# Patient Record
Sex: FEMALE | Race: WHITE | Employment: OTHER | URBAN - METROPOLITAN AREA
[De-identification: names, ages, dates, MRNs, and addresses within clinical notes are randomized per-mention and may not be internally consistent; named-entity substitution may affect disease eponyms.]

---

## 2017-10-02 ENCOUNTER — HOSPITAL ENCOUNTER (EMERGENCY)
Age: 67
Discharge: HOME | End: 2017-10-02
Attending: EMERGENCY MEDICINE
Payer: MEDICARE

## 2017-10-02 ENCOUNTER — APPOINTMENT (OUTPATIENT)
Dept: GENERAL RADIOLOGY | Age: 67
End: 2017-10-02
Payer: MEDICARE

## 2017-10-02 VITALS
HEART RATE: 87 BPM | RESPIRATION RATE: 18 BRPM | TEMPERATURE: 98.6 F | SYSTOLIC BLOOD PRESSURE: 138 MMHG | HEIGHT: 61 IN | OXYGEN SATURATION: 97 % | BODY MASS INDEX: 26.81 KG/M2 | DIASTOLIC BLOOD PRESSURE: 69 MMHG | WEIGHT: 142 LBS

## 2017-10-02 DIAGNOSIS — S63.91XA HAND SPRAIN, RIGHT, INITIAL ENCOUNTER: Primary | ICD-10-CM

## 2017-10-02 PROCEDURE — 99283 EMERGENCY DEPT VISIT LOW MDM: CPT

## 2017-10-02 PROCEDURE — 73130 X-RAY EXAM OF HAND: CPT

## 2017-10-02 PROCEDURE — 73110 X-RAY EXAM OF WRIST: CPT

## 2017-10-02 RX ORDER — NAPROXEN 500 MG/1
500 TABLET ORAL 2 TIMES DAILY WITH MEALS
Qty: 20 TABLET | Refills: 0 | Status: SHIPPED | OUTPATIENT
Start: 2017-10-02

## 2017-10-02 ASSESSMENT — PAIN DESCRIPTION - DESCRIPTORS: DESCRIPTORS: THROBBING;ACHING;SHARP;STABBING

## 2017-10-02 ASSESSMENT — PAIN SCALES - GENERAL: PAINLEVEL_OUTOF10: 8

## 2017-10-02 ASSESSMENT — PAIN DESCRIPTION - LOCATION: LOCATION: WRIST

## 2017-10-02 ASSESSMENT — PAIN DESCRIPTION - ORIENTATION: ORIENTATION: RIGHT

## 2017-10-02 ASSESSMENT — PAIN DESCRIPTION - PAIN TYPE: TYPE: ACUTE PAIN

## 2017-10-02 NOTE — ED AVS SNAPSHOT
guidelines. However these guidelines can be individualized by your provider. 9/22/2001    Colonoscopy 9/22/2001    Zoster Vaccine 9/22/2011    Osteoporosis screening or a bone density scan (Dexa) is recommended once at age 72. Based upon the results and risk factors for bone loss, your provider will recommend whether this needs to be repeated. 9/22/2016    Pneumococcal Vaccines (two) for all adults aged 72 and over (1 of 2 - PCV13) 9/22/2016    Yearly Flu Vaccine (1) 9/1/2017                 Care Plan Once You Return Home    This section includes instructions you will need to follow once you leave the hospital.  Your care team will discuss these with you, so you and those caring for you know how to best care for your health needs at home. This section may also include educational information about certain health topics that may be of help to you. Important Information if you smoke or are exposed to smoking       SMOKING: QUIT SMOKING. THIS IS THE MOST IMPORTANT ACTION YOU CAN TAKE TO IMPROVE YOUR CURRENT AND FUTURE HEALTH. Call the [address] [place] [address] José Miguel at [place] NOW ([phone])    Smoking harms nonsmokers. When nonsmokers are around people who smoke, they absorb nicotine, carbon monoxide, and other ingredients of tobacco smoke. DO NOT SMOKE AROUND CHILDREN     Children exposed to secondhand smoke are at an increased risk of:  Sudden Infant Death Syndrome (SIDS), acute respiratory infections, inflammation of the middle ear, and severe asthma. Over a longer time, it causes heart disease and lung cancer. There is no safe level of exposure to secondhand smoke. [employer] Signup     [employer] allows you to send messages to your doctor, view your test results, renew your prescriptions, schedule appointments, view visit notes, and more. How Do I Sign Up? 1. In your Internet browser, go to [email or web address] 2. Click on the Sign Up Now link in the Sign In box. You will see the New Member Sign Up page. 3. Enter your [employer] M[employer] Access Code exactly as it appears below. You will not need to use this code after youve completed the sign-up process. If you do not sign up before the expiration date, you must request a new code. [employer] Access Code: [email or web address]  Expires: 12/1/2017 10:34 PM    4. Enter your Social Security Number ([id number]) and Date of Birth (mm/dd/yyyy) as indicated and click Submit. You will be taken to the next sign-up page. 5. Create a Funk[employer] Moves ID. This will be your [employer] login ID and cannot be changed, so think of one that is secure and easy to remember. 6. Create a [employer] password. You can change your password at any time. 7. Enter your Password Reset Question and Answer. This can be used at a later time if you forget your password. 8. Enter your e-mail address. You will receive e-mail notification when new information is available in [address] 9. Click Sign Up. You can now view your medical record. Additional Information  If you have questions, please contact the physician practice where you receive care. Remember, [employer] is NOT to be used for urgent needs. For medical emergencies, dial 911. For questions regarding your [employer] account call [phone]. If you have a clinical question, please call your doctor's office. View your information online  ? Review your current list of  medications, immunization, and allergies. ? Review your future test results online . ? Review your discharge instructions provided by your caregivers at discharge    Certain functionality such as prescription refills, scheduling appointments or sending messages to your provider are not activated if your provider does not use [employer] in his/her office    For questions regarding your [employer] account call [phone]. If you have a clinical question, please call your doctor's office. The information on all pages of the After Visit Summary has been reviewed with me, the patient and/or responsible adult, by my health care provider(s). I had the opportunity to ask questions regarding this information. I understand I should dispose of my armband safely at home to protect my health information. A complete copy of the After Visit Summary has been given to me, the patient and/or responsible adult. Patient Signature/Responsible Adult: ___________________________________    Nurse Signature: ___________________________________  Resident/MLP Signature: ___________________________________  Attending Signature: ___________________________________    Date:____________Time:____________              Discharge Instructions       Take meds as prescribed. Follow up with doctor  in 3 -4 days. Return to ER immediately if symptoms worsen or persist.         Hand Sprain: Care Instructions  Your Care Instructions  A hand sprain occurs when you stretch or tear a ligament in your hand. Ligaments are the tough tissues that connect one bone to another. Most hand sprains will heal with treatment you can do at home. Follow-up care is a key part of your treatment and safety. Be sure to make and go to all appointments, and call your doctor if you are having problems. It's also a good idea to know your test results and keep a list of the medicines you take. How can you care for yourself at home? · If your doctor gave you a splint or immobilizer, wear it as directed. This will help keep swelling down and help your hand heal.  · Follow your doctor's directions for exercise and other activity. · For the first 2 days after your injury, avoid things that might increase swelling, such as hot showers, hot tubs, or hot packs. · Put ice or a cold pack on your hand for 10 to 20 minutes at a time to stop swelling.  Try this every 1 to 2 hours for 3 days (when you are awake) or until the swelling goes down. Put a thin cloth between the ice pack and your skin. Keep your splint dry. · After 2 or 3 days, if your swelling is gone, put a heating pad (set on low) or a warm cloth on your hand. Some experts suggest that you go back and forth between hot and cold treatments. · Prop up your hand on a pillow when you ice it or anytime you sit or lie down. Try to keep it above the level of your heart. This will help reduce swelling. · Take pain medicines exactly as directed. ¨ If the doctor gave you a prescription medicine for pain, take it as prescribed. ¨ If you are not taking a prescription pain medicine, ask your doctor if you can take an over-the-counter medicine. · Return to your usual level of activity slowly. When should you call for help? Call your doctor now or seek immediate medical care if:  · Your pain is worse. · You have new or increased swelling in your hand. · You cannot move your hand. · You have tingling, weakness, or numbness in your hand or fingers. · Your hand or fingers are cool or pale or change color. · You have a fever. · Your hand or fingers are red. Watch closely for changes in your health, and be sure to contact your doctor if:  · Your hand does not get better as expected. Where can you learn more? Go to https://[employer]. org and sign in to your tracx account. Enter G705 in the [place] box to learn more about \"Hand Sprain: Care Instructions. \"     If you do not have an account, please click on the \"Sign Up Now\" link. Current as of: March 21, 2017  Content Version: 11.3  © 9505-4759 [employer]. Care instructions adapted under license by [facility] ([facility]). If you have questions about a medical condition or this instruction, always ask your healthcare professional. [address] any warranty or liability for your use of this information.

## 2022-07-25 NOTE — ED PROVIDER NOTES
[address] [place] [address] ED  eMERGENCY dEPARTMENT eNCOUnter      Pt Name: Aman Luna  MRN: [id number]  [place] 1950  Date of evaluation: 10/2/2017  Provider: Anh Mckenna PA-C    CHIEF COMPLAINT       Chief Complaint   Patient presents with    Wrist Pain     right         HISTORY OF PRESENT ILLNESS  (Location/Symptom, Timing/Onset, Context/Setting, Quality, Duration, Modifying Factors, Severity.)   Aman Luna is a 78 y.o. female who presents to the emergency department with Right hand and wrist pain status post fall earlier today. Pain described as mild, constant, sore. Pain worse with movement and relieved with rest.  Did not hit head or neck. No other complaints. Nursing Notes were reviewed. ALLERGIES     Review of patient's allergies indicates no known allergies. CURRENT MEDICATIONS       Discharge Medication List as of 10/2/2017 10:34 PM          PAST MEDICAL HISTORY         Diagnosis Date    Depression     Diabetes mellitus ([address]     Glaucoma     Hyperlipidemia     Hypertension        SURGICAL HISTORY           Procedure Laterality Date    BUNIONECTOMY      lt    HYSTERECTOMY      NECK SURGERY      reconstruction due to  birth defect    TONSILLECTOMY           FAMILY HISTORY     History reviewed. No pertinent family history. No family status information on file. SOCIAL HISTORY      reports that she has never smoked. She does not have any smokeless tobacco history on file. She reports that she drinks alcohol. She reports that she does not use illicit drugs. REVIEW OF SYSTEMS    (2-9 systems for level 4, 10 or more for level 5)   Review of Systems    Except as noted above the remainder of the review of systems was reviewed and negative.      PHYSICAL EXAM    (up to 7 for level 4, 8 or more for level 5)   ED Triage Vitals   BP Temp Temp Source Pulse Resp SpO2 Height Weight   10/02/17 2054 10/02/17 2054 10/02/17 2054 10/02/17 2054 10/02/17 2054 10/02/17 2054 10/02/17 2054 10/02/17 2054   138/69 98.6 °F (37 °C) Oral 87 18 97 % 5' 1\" (1.549 m) 142 lb (64.4 kg)     Physical Exam   Constitutional: She is oriented to person, place, and time. She appears well-developed. HENT:   Head: Normocephalic and atraumatic. Neck: Normal range of motion. Neck supple. Cardiovascular: Normal rate and regular rhythm. Pulmonary/Chest: Effort normal and breath sounds normal.   Abdominal: Soft. Musculoskeletal: Normal range of motion. Right hand: She exhibits tenderness. Hands:  Neurological: She is alert and oriented to person, place, and time. Skin: Skin is warm. No rash noted. Psychiatric: She has a normal mood and affect. Her behavior is normal.               DIAGNOSTIC RESULTS     EKG: All EKG's are interpreted by the Emergency Department Physician who either signs or Co-signs this chart in the absence of a cardiologist.        RADIOLOGY:   Non-plain film images such as CT, Ultrasound and MRI are read by the radiologist. Plain radiographic images are visualized and preliminarily interpreted by the emergency physician with the below findings:        Interpretation per the Radiologist below, if available at the time of this note:          ED BEDSIDE ULTRASOUND:   Performed by ED Physician - none    LABS:  Labs Reviewed - No data to display    All other labs were within normal range or not returned as of this dictation. EMERGENCY DEPARTMENT COURSE and DIFFERENTIAL DIAGNOSIS/MDM:   Vitals:    Vitals:    10/02/17 2054   BP: 138/69   Pulse: 87   Resp: 18   Temp: 98.6 °F (37 °C)   TempSrc: Oral   SpO2: 97%   Weight: 142 lb (64.4 kg)   Height: 5' 1\" (1.549 m)     X-rays are negative. Patient given a thumb spica and discharged home. He will follow up outpatient basis. dispo delayed due to waiting on radiologyy. CONSULTS:  None    PROCEDURES:  Procedures  Right elcro thumb spica to right hand  well placed by nursing staff.   Post application examination by me reveals that it is appropriately placed and the right upper extremity is neuro/vasc intact. FINAL IMPRESSION      1.  Hand sprain, right, initial encounter          DISPOSITION/PLAN   DISPOSITION Decision to Discharge    PATIENT REFERRED TO:   Tiffany Dorman MD  [address] [place] [id number]    In 3 days        DISCHARGE MEDICATIONS:     Discharge Medication List as of 10/2/2017 10:34 PM      START taking these medications    Details   naproxen (NAPROSYN) 500 MG tablet Take 1 tablet by mouth 2 times daily (with meals), Disp-20 tablet, R-0Print                 (Please note that portions of this note were completed with a voice recognition program.  Efforts were made to edit the dictations but occasionally words are mis-transcribed.)    KIMBERLY Skaggs PA-C  10/03/17 0010 Undermining Type: Entire Wound